# Patient Record
Sex: MALE | Race: WHITE
[De-identification: names, ages, dates, MRNs, and addresses within clinical notes are randomized per-mention and may not be internally consistent; named-entity substitution may affect disease eponyms.]

---

## 2017-12-28 ENCOUNTER — HOSPITAL ENCOUNTER (OUTPATIENT)
Dept: HOSPITAL 88 - PT | Age: 62
LOS: 3 days | End: 2017-12-31
Attending: SPECIALIST
Payer: COMMERCIAL

## 2017-12-28 DIAGNOSIS — M25.512: ICD-10-CM

## 2017-12-28 DIAGNOSIS — M75.42: Primary | ICD-10-CM

## 2017-12-28 DIAGNOSIS — M62.81: ICD-10-CM

## 2018-01-01 NOTE — DISCHARGE SUMMARY
SARITA MARIE, LENGTH 0:1





 



 _________________________________

RIVERA FIGUEROA MD



DD:  12/31/2017 13:17

DT:  01/01/2018 01:53

Job#:  [id number]

## 2018-01-05 ENCOUNTER — HOSPITAL ENCOUNTER (OUTPATIENT)
Dept: HOSPITAL 88 - PT | Age: 63
LOS: 26 days | End: 2018-01-31
Attending: SPECIALIST
Payer: COMMERCIAL

## 2018-01-05 DIAGNOSIS — M62.81: ICD-10-CM

## 2018-01-05 DIAGNOSIS — M25.512: ICD-10-CM

## 2018-01-05 DIAGNOSIS — M75.42: Primary | ICD-10-CM
